# Patient Record
Sex: FEMALE
[De-identification: names, ages, dates, MRNs, and addresses within clinical notes are randomized per-mention and may not be internally consistent; named-entity substitution may affect disease eponyms.]

---

## 2019-11-07 ENCOUNTER — APPOINTMENT (OUTPATIENT)
Dept: ORTHOPEDIC SURGERY | Facility: CLINIC | Age: 59
End: 2019-11-07
Payer: COMMERCIAL

## 2019-11-07 DIAGNOSIS — Z78.9 OTHER SPECIFIED HEALTH STATUS: ICD-10-CM

## 2019-11-07 DIAGNOSIS — Z72.89 OTHER PROBLEMS RELATED TO LIFESTYLE: ICD-10-CM

## 2019-11-07 DIAGNOSIS — M20.41 OTHER HAMMER TOE(S) (ACQUIRED), RIGHT FOOT: ICD-10-CM

## 2019-11-07 DIAGNOSIS — M21.611 BUNION OF RIGHT FOOT: ICD-10-CM

## 2019-11-07 DIAGNOSIS — M79.671 PAIN IN RIGHT FOOT: ICD-10-CM

## 2019-11-07 DIAGNOSIS — M79.672 PAIN IN RIGHT FOOT: ICD-10-CM

## 2019-11-07 DIAGNOSIS — Z80.41 FAMILY HISTORY OF MALIGNANT NEOPLASM OF OVARY: ICD-10-CM

## 2019-11-07 DIAGNOSIS — M21.612 BUNION OF LEFT FOOT: ICD-10-CM

## 2019-11-07 DIAGNOSIS — M20.42 OTHER HAMMER TOE(S) (ACQUIRED), LEFT FOOT: ICD-10-CM

## 2019-11-07 PROCEDURE — 99204 OFFICE O/P NEW MOD 45 MIN: CPT

## 2019-11-07 PROCEDURE — 73630 X-RAY EXAM OF FOOT: CPT | Mod: RT

## 2019-11-07 RX ORDER — TOPIRAMATE 50 MG/1
50 TABLET, FILM COATED ORAL
Qty: 30 | Refills: 0 | Status: ACTIVE | COMMUNITY
Start: 2019-10-29

## 2019-11-07 RX ORDER — SUMATRIPTAN 100 MG/1
100 TABLET, FILM COATED ORAL
Qty: 30 | Refills: 0 | Status: ACTIVE | COMMUNITY
Start: 2018-11-03

## 2019-11-07 RX ORDER — DESVENLAFAXINE 100 MG/1
100 TABLET, EXTENDED RELEASE ORAL
Qty: 90 | Refills: 0 | Status: ACTIVE | COMMUNITY
Start: 2018-11-03

## 2019-11-07 RX ORDER — CIPROFLOXACIN HYDROCHLORIDE 500 MG/1
500 TABLET, FILM COATED ORAL
Qty: 10 | Refills: 0 | Status: ACTIVE | COMMUNITY
Start: 2019-10-30

## 2019-11-07 RX ORDER — MUPIROCIN 20 MG/G
2 OINTMENT TOPICAL
Qty: 22 | Refills: 0 | Status: ACTIVE | COMMUNITY
Start: 2019-07-22

## 2019-11-07 RX ORDER — PHENTERMINE HYDROCHLORIDE 37.5 MG/1
37.5 TABLET ORAL
Qty: 30 | Refills: 0 | Status: ACTIVE | COMMUNITY
Start: 2019-10-29

## 2019-11-07 NOTE — DISCUSSION/SUMMARY
[de-identified] : Today I had a lengthy discussion with the patient regarding their BL foot pain.I have addressed all the patient's concerns surrounding the pathology of their condition. A discussion was had about a Lapidus fusion surgery for the left foot. I informed the patient that post-surgery she will be non-weight bearing for 6 weeks. I advised the patient to follow up in the office to further discuss the surgery when she wants to proceed. The patient understood and verbally agreed to the treatment plan. All of their questions were answered and they were satisfied with the visit. The patient should call the office if they have any questions or experience worsening symptoms.\par \par \par

## 2019-11-07 NOTE — HISTORY OF PRESENT ILLNESS
[FreeTextEntry1] : 59 year old female presenting with BL foot pain. The patient’s pain is noted to be a 7/10. The patient is here because of chronic bunions and hammertoes. The left is noted to be worse than the right. The patient describes their pain as constant, shooting, and sharp. The pain is worse when wearing certain types of shoes. She is currently taking no pain medication. No other complaints at this time.\par \par \par

## 2019-11-07 NOTE — CONSULT LETTER
[Consult Letter:] : I had the pleasure of evaluating your patient, [unfilled]. [Please see my note below.] : Please see my note below. [Consult Closing:] : Thank you very much for allowing me to participate in the care of this patient.  If you have any questions, please do not hesitate to contact me. [Sincerely,] : Sincerely, [FreeTextEntry3] : Edgardo Watts, DO\par Foot and Ankle Surgery\par

## 2019-11-07 NOTE — PHYSICAL EXAM
[de-identified] : 3V of the BL feet were ordered obtained and reviewed by me today, 11/07/2019 , revealed: Significant hallux valgus, 2nd toe dislocated on the left.\par \par \par   [de-identified] : General: Alert and oriented x3. In no acute distress. Pleasant in nature with a normal affect. No apparent respiratory distress.\par \par L Foot Exam\par Skin: Clean, dry, intact\par Inspection: +significant hallux valgus, +hammertoes to 2, 3, 4, and 5th toes. No masses, no swelling, no effusion\par Pulses: 2+ DP/PT pulses\par ROM: FOOT Full ROM of digits, ANKLE 10 degrees of dorsiflexion, 40 degrees of plantarflexion, 10 degrees of subtalar motion.\par Painful ROM: None\par Tenderness: No tenderness over the medial malleolus, No tenderness over the lateral malleolus, no CFL/ATFL/PTFL pain, no deltoid ligament pain. No heel pain. No Achilles tenderness. No 5th metatarsal pain. No pain to the LisFranc joint. No ttp over the posterior tibial tendon.\par Stability: Negative anterior/posterior drawer.\par Strength: 5/5 ADD/ABD/TA/GS/EHL/FHL/EDL\par Neuro: Sensation in tact to light touch throughout\par Additional tests: Negative Mortons test, negative tarsal tunnel tinels, negative single heel rise.

## 2019-11-07 NOTE — ADDENDUM
[FreeTextEntry1] : I, Tito Thu, acted solely as a scribe for Dr. Edgardo Watts on this date 11/07/2019 .\par All medical record entries made by the Scribe were at my, Dr. Edgardo Watts, direction and personally dictated by me on 11/07/2019 . I have reviewed the chart and agree that the record accurately reflects my personal performance of the history, physical exam, assessment and plan. I have also personally directed, reviewed, and agreed with the chart.\par \par \par

## 2021-03-21 ENCOUNTER — TRANSCRIPTION ENCOUNTER (OUTPATIENT)
Age: 61
End: 2021-03-21

## 2021-03-24 ENCOUNTER — TRANSCRIPTION ENCOUNTER (OUTPATIENT)
Age: 61
End: 2021-03-24

## 2021-07-10 ENCOUNTER — INPATIENT (INPATIENT)
Facility: HOSPITAL | Age: 61
LOS: 1 days | Discharge: HOME | DRG: 87 | End: 2021-07-12
Attending: SPECIALIST | Admitting: SPECIALIST
Payer: COMMERCIAL

## 2021-07-10 VITALS
HEIGHT: 65 IN | RESPIRATION RATE: 18 BRPM | SYSTOLIC BLOOD PRESSURE: 109 MMHG | HEART RATE: 69 BPM | TEMPERATURE: 97 F | DIASTOLIC BLOOD PRESSURE: 54 MMHG | WEIGHT: 139.99 LBS

## 2021-07-10 DIAGNOSIS — I60.9 NONTRAUMATIC SUBARACHNOID HEMORRHAGE, UNSPECIFIED: ICD-10-CM

## 2021-07-10 DIAGNOSIS — Z98.890 OTHER SPECIFIED POSTPROCEDURAL STATES: Chronic | ICD-10-CM

## 2021-07-10 LAB
ALBUMIN SERPL ELPH-MCNC: 3.7 G/DL — SIGNIFICANT CHANGE UP (ref 3.3–5)
ANION GAP SERPL CALC-SCNC: 3 MMOL/L — LOW (ref 5–17)
APTT BLD: 25.1 SEC — LOW (ref 27.5–35.5)
AST SERPL-CCNC: 69 U/L — HIGH (ref 15–37)
BASOPHILS # BLD AUTO: 0.09 K/UL — SIGNIFICANT CHANGE UP (ref 0–0.2)
BASOPHILS NFR BLD AUTO: 1 % — SIGNIFICANT CHANGE UP (ref 0–2)
BUN SERPL-MCNC: 29 MG/DL — HIGH (ref 7–23)
CALCIUM SERPL-MCNC: 9.3 MG/DL — SIGNIFICANT CHANGE UP (ref 8.5–10.1)
CHLORIDE SERPL-SCNC: 106 MMOL/L — SIGNIFICANT CHANGE UP (ref 96–108)
CO2 SERPL-SCNC: 29 MMOL/L — SIGNIFICANT CHANGE UP (ref 22–31)
CREAT SERPL-MCNC: 0.68 MG/DL — SIGNIFICANT CHANGE UP (ref 0.5–1.3)
EOSINOPHIL # BLD AUTO: 0.15 K/UL — SIGNIFICANT CHANGE UP (ref 0–0.5)
EOSINOPHIL NFR BLD AUTO: 1.7 % — SIGNIFICANT CHANGE UP (ref 0–6)
GLUCOSE SERPL-MCNC: 128 MG/DL — HIGH (ref 70–99)
HCT VFR BLD CALC: 37 % — SIGNIFICANT CHANGE UP (ref 34.5–45)
HGB BLD-MCNC: 11.9 G/DL — SIGNIFICANT CHANGE UP (ref 11.5–15.5)
IMM GRANULOCYTES NFR BLD AUTO: 0.4 % — SIGNIFICANT CHANGE UP (ref 0–1.5)
INR BLD: 0.9 RATIO — SIGNIFICANT CHANGE UP (ref 0.88–1.16)
LYMPHOCYTES # BLD AUTO: 2.3 K/UL — SIGNIFICANT CHANGE UP (ref 1–3.3)
LYMPHOCYTES # BLD AUTO: 25.8 % — SIGNIFICANT CHANGE UP (ref 13–44)
MCHC RBC-ENTMCNC: 29.4 PG — SIGNIFICANT CHANGE UP (ref 27–34)
MCHC RBC-ENTMCNC: 32.2 GM/DL — SIGNIFICANT CHANGE UP (ref 32–36)
MCV RBC AUTO: 91.4 FL — SIGNIFICANT CHANGE UP (ref 80–100)
MONOCYTES # BLD AUTO: 0.83 K/UL — SIGNIFICANT CHANGE UP (ref 0–0.9)
MONOCYTES NFR BLD AUTO: 9.3 % — SIGNIFICANT CHANGE UP (ref 2–14)
NEUTROPHILS # BLD AUTO: 5.5 K/UL — SIGNIFICANT CHANGE UP (ref 1.8–7.4)
NEUTROPHILS NFR BLD AUTO: 61.8 % — SIGNIFICANT CHANGE UP (ref 43–77)
PLATELET # BLD AUTO: 270 K/UL — SIGNIFICANT CHANGE UP (ref 150–400)
POTASSIUM SERPL-MCNC: 3.7 MMOL/L — SIGNIFICANT CHANGE UP (ref 3.5–5.3)
POTASSIUM SERPL-SCNC: 3.7 MMOL/L — SIGNIFICANT CHANGE UP (ref 3.5–5.3)
PROTHROM AB SERPL-ACNC: 10.5 SEC — LOW (ref 10.6–13.6)
RBC # BLD: 4.05 M/UL — SIGNIFICANT CHANGE UP (ref 3.8–5.2)
RBC # FLD: 12.8 % — SIGNIFICANT CHANGE UP (ref 10.3–14.5)
SODIUM SERPL-SCNC: 138 MMOL/L — SIGNIFICANT CHANGE UP (ref 135–145)
WBC # BLD: 8.91 K/UL — SIGNIFICANT CHANGE UP (ref 3.8–10.5)
WBC # FLD AUTO: 8.91 K/UL — SIGNIFICANT CHANGE UP (ref 3.8–10.5)

## 2021-07-10 PROCEDURE — 85025 COMPLETE CBC W/AUTO DIFF WBC: CPT

## 2021-07-10 PROCEDURE — A9579: CPT

## 2021-07-10 PROCEDURE — 85610 PROTHROMBIN TIME: CPT

## 2021-07-10 PROCEDURE — 70450 CT HEAD/BRAIN W/O DYE: CPT | Mod: 26,MA

## 2021-07-10 PROCEDURE — 85730 THROMBOPLASTIN TIME PARTIAL: CPT

## 2021-07-10 PROCEDURE — 80053 COMPREHEN METABOLIC PANEL: CPT

## 2021-07-10 PROCEDURE — 87086 URINE CULTURE/COLONY COUNT: CPT

## 2021-07-10 PROCEDURE — 70498 CT ANGIOGRAPHY NECK: CPT

## 2021-07-10 PROCEDURE — 93306 TTE W/DOPPLER COMPLETE: CPT

## 2021-07-10 PROCEDURE — 84443 ASSAY THYROID STIM HORMONE: CPT

## 2021-07-10 PROCEDURE — 70496 CT ANGIOGRAPHY HEAD: CPT

## 2021-07-10 PROCEDURE — 83036 HEMOGLOBIN GLYCOSYLATED A1C: CPT

## 2021-07-10 PROCEDURE — 81003 URINALYSIS AUTO W/O SCOPE: CPT

## 2021-07-10 PROCEDURE — 36415 COLL VENOUS BLD VENIPUNCTURE: CPT

## 2021-07-10 PROCEDURE — 70450 CT HEAD/BRAIN W/O DYE: CPT

## 2021-07-10 PROCEDURE — 71045 X-RAY EXAM CHEST 1 VIEW: CPT | Mod: 26

## 2021-07-10 PROCEDURE — 93010 ELECTROCARDIOGRAM REPORT: CPT

## 2021-07-10 PROCEDURE — 72125 CT NECK SPINE W/O DYE: CPT | Mod: 26,MA

## 2021-07-10 PROCEDURE — 99291 CRITICAL CARE FIRST HOUR: CPT

## 2021-07-10 PROCEDURE — U0003: CPT

## 2021-07-10 PROCEDURE — 70553 MRI BRAIN STEM W/O & W/DYE: CPT

## 2021-07-10 PROCEDURE — 99221 1ST HOSP IP/OBS SF/LOW 40: CPT

## 2021-07-10 PROCEDURE — 86769 SARS-COV-2 COVID-19 ANTIBODY: CPT

## 2021-07-10 PROCEDURE — U0005: CPT

## 2021-07-10 PROCEDURE — 86803 HEPATITIS C AB TEST: CPT

## 2021-07-10 PROCEDURE — 80307 DRUG TEST PRSMV CHEM ANLYZR: CPT

## 2021-07-10 RX ORDER — THIAMINE MONONITRATE (VIT B1) 100 MG
100 TABLET ORAL DAILY
Refills: 0 | Status: DISCONTINUED | OUTPATIENT
Start: 2021-07-10 | End: 2021-07-12

## 2021-07-10 RX ORDER — SODIUM CHLORIDE 9 MG/ML
1000 INJECTION INTRAMUSCULAR; INTRAVENOUS; SUBCUTANEOUS
Refills: 0 | Status: DISCONTINUED | OUTPATIENT
Start: 2021-07-10 | End: 2021-07-12

## 2021-07-10 RX ORDER — ONDANSETRON 8 MG/1
4 TABLET, FILM COATED ORAL EVERY 6 HOURS
Refills: 0 | Status: DISCONTINUED | OUTPATIENT
Start: 2021-07-10 | End: 2021-07-12

## 2021-07-10 RX ORDER — LEVETIRACETAM 250 MG/1
500 TABLET, FILM COATED ORAL EVERY 12 HOURS
Refills: 0 | Status: DISCONTINUED | OUTPATIENT
Start: 2021-07-10 | End: 2021-07-12

## 2021-07-10 RX ORDER — PANTOPRAZOLE SODIUM 20 MG/1
40 TABLET, DELAYED RELEASE ORAL
Refills: 0 | Status: DISCONTINUED | OUTPATIENT
Start: 2021-07-10 | End: 2021-07-12

## 2021-07-10 RX ORDER — FOLIC ACID 0.8 MG
1 TABLET ORAL DAILY
Refills: 0 | Status: DISCONTINUED | OUTPATIENT
Start: 2021-07-10 | End: 2021-07-12

## 2021-07-10 NOTE — ED PROVIDER NOTE - OBJECTIVE STATEMENT
60 y/o female with no pertinent PMHx, presents to the ED BIBA s/p unwitnessed fall at home. Pt does not remember what happened. Is unsure if she had a syncopal episode prior to fall. Pt hit the back of her head. No other injury. +drinking alcohol tonight. Not on blood thinners.

## 2021-07-10 NOTE — ED PROVIDER NOTE - MUSCULOSKELETAL, MLM
Spine appears normal, range of motion is not limited, no muscle or joint tenderness. no visible evidence of trauma.

## 2021-07-10 NOTE — H&P ADULT - NSHPPHYSICALEXAM_GEN_ALL_CORE
Constitutional: awake and alert.  HEENT: PERRLA, EOMI  Neck: Supple.  Respiratory: Breath sounds are clear bilaterally  Cardiovascular: S1 and S2, regular   Gastrointestinal: soft, nontender  Extremities:  no edema  Musculoskeletal: no joint swelling/tenderness, no abnormal movements  Skin: No rashes    Neurological exam:  HF: A x O x 3. Appropriately interactive, normal affect. Speech fluent, No Aphasia or paraphasic errors. Naming /repetition intact   CN: KAREN, EOMI, VFF, facial sensation normal, no NLFD, tongue midline  Motor: No pronator drift, Strength 5/5 in all 4 ext, normal bulk and tone, no tremor, rigidity or bradykinesia.    Sens: Intact to light touch  Reflexes: Symmetric and normal, downgoing toes b/l  Coord:  No FNFA, dysmetria, ROCKY intact

## 2021-07-10 NOTE — ED PROVIDER NOTE - PROGRESS NOTE DETAILS
SAH noted.   Likely traumatic in etiology.  Given unwitnessed and patient does not recall, will  angio for  eval.  Discussed with NSG who will admit for further w/u and monitoring.  Further care per inpatient treatmtent team.

## 2021-07-10 NOTE — ED ADULT TRIAGE NOTE - CHIEF COMPLAINT QUOTE
Pt BIBEMS s/p unwitnessed fall at home.  +ETOH, + marijuana use.  Pt w/ c/o of pain to back of head.  No A/C.  MD Feliciano made aware.  neuro alert called.  Pt sent to CT

## 2021-07-10 NOTE — CHART NOTE - NSCHARTNOTEFT_GEN_A_CORE
CAPRINI SCORE [CLOT]    AGE RELATED RISK FACTORS                                                       MOBILITY RELATED FACTORS  [ ] Age 41-60 years                                            (1 Point)                  [ ] Bed rest                                                        (1 Point)  [ x] Age: 61-74 years                                           (2 Points)                 [ ] Plaster cast                                                   (2 Points)  [ ] Age= 75 years                                              (3 Points)                 [ ] Bed bound for more than 72 hours                 (2 Points)    DISEASE RELATED RISK FACTORS                                               GENDER SPECIFIC FACTORS  [ ] Edema in the lower extremities                       (1 Point)                  [ ] Pregnancy                                                     (1 Point)  [ ] Varicose veins                                               (1 Point)                  [ ] Post-partum < 6 weeks                                   (1 Point)             [ ] BMI > 25 Kg/m2                                            (1 Point)                  [ ] Hormonal therapy  or oral contraception          (1 Point)                 [ ] Sepsis (in the previous month)                        (1 Point)                  [ ] History of pregnancy complications                 (1 point)  [ ] Pneumonia or serious lung disease                                               [ ] Unexplained or recurrent                     (1 Point)           (in the previous month)                               (1 Point)  [ ] Abnormal pulmonary function test                     (1 Point)                 SURGERY RELATED RISK FACTORS  [ ] Acute myocardial infarction                              (1 Point)                 [ ]  Section                                             (1 Point)  [ ] Congestive heart failure (in the previous month)  (1 Point)               [ ] Minor surgery                                                  (1 Point)   [ ] Inflammatory bowel disease                             (1 Point)                 [ ] Arthroscopic surgery                                        (2 Points)  [ ] Central venous access                                      (2 Points)                [ ] General surgery lasting more than 45 minutes   (2 Points)       [ ] Stroke (in the previous month)                          (5 Points)               [ ] Elective arthroplasty                                         (5 Points)                                                                                                                                               HEMATOLOGY RELATED FACTORS                                                 TRAUMA RELATED RISK FACTORS  [ ] Prior episodes of VTE                                     (3 Points)                [ ] Fracture of the hip, pelvis, or leg                       (5 Points)  [ ] Positive family history for VTE                         (3 Points)                 [ ] Acute spinal cord injury (in the previous month)  (5 Points)  [ ] Prothrombin 93243 A                                     (3 Points)                 [ ] Paralysis  (less than 1 month)                             (5 Points)  [ ] Factor V Leiden                                             (3 Points)                  [ ] Multiple Trauma within 1 month                        (5 Points)  [ ] Lupus anticoagulants                                     (3 Points)                                                           [ ] Anticardiolipin antibodies                               (3 Points)                                                       [ ] High homocysteine in the blood                      (3 Points)                                             [ ] Other congenital or acquired thrombophilia      (3 Points)                                                [ ] Heparin induced thrombocytopenia                  (3 Points)                                          Total Score [   2       ]    due to presence of ICH will use mechanical prophylaxis at this time and reassess during admission

## 2021-07-10 NOTE — ED PROVIDER NOTE - CLINICAL SUMMARY MEDICAL DECISION MAKING FREE TEXT BOX
pt intoxicated likely mechanical fall which was unwitnessed, does not recall event, will ct head, ekg, cardiac enzyme, reassess. pt intoxicated. likely mechanical fall which was unwitnessed, does not recall event, will ct head, ekg, cardiac enzyme, reassess.

## 2021-07-10 NOTE — H&P ADULT - NSICDXPASTSURGICALHX_GEN_ALL_CORE_FT
PAST SURGICAL HISTORY:  H/O bilateral breast reduction surgery     S/P excision of acoustic neuroma 1983

## 2021-07-10 NOTE — H&P ADULT - NSHPLABSRESULTS_GEN_ALL_CORE
11.9   8.91  )-----------( 270      ( 10 Jul 2021 23:09 )             37.0   07-10    138  |  106  |  29<H>  ----------------------------<  128<H>  3.7   |  29  |  0.68    Ca    9.3      10 Jul 2021 23:09    TPro  x   /  Alb  3.7  /  TBili  x   /  DBili  x   /  AST  69<H>  /  ALT  x   /  AlkPhos  x   07-10    Prothrombin Time and INR, Plasma (07.10.21 @ 23:30)   Prothrombin Time, Plasma: 10.5 sec   INR: 0.90: Recommended ranges for therapeutic INR: Activated Partial Thromboplastin Time: 25.1 sec (07.10.21 @ 23:30)     < from: CT Head No Cont (07.10.21 @ 22:29) >    Impression:  1. Small amount of bilateral acute subarachnoid hemorrhage.  2. No acute displaced cervical spine fracture.    Findings were discussed with Dr. Feliciano at 10:59 PM on 7/10/21  Dr. Jada Ricketts

## 2021-07-10 NOTE — ED ADULT NURSE NOTE - NSIMPLEMENTINTERV_GEN_ALL_ED
Implemented All Fall Risk Interventions:  [place] to call system. Call bell, personal items and telephone within reach. Instruct patient to call for assistance. Room bathroom lighting operational. Non-slip footwear when patient is off stretcher. Physically safe environment: no spills, clutter or unnecessary equipment. Stretcher in lowest position, wheels locked, appropriate side rails in place. Provide visual cue, wrist band, yellow gown, etc. Monitor gait and stability. Monitor for mental status changes and reorient to person, place, and time. Review medications for side effects contributing to fall risk. Reinforce activity limits and safety measures with patient and family.

## 2021-07-10 NOTE — H&P ADULT - ASSESSMENT
61 year old female s/p unwitnessed fall s/p intoxication with ETOH and marijuana with TSAH.     Imaging reviewed by and case discussed with Dr. Castaneda  admit to SDU/CICU for monitoring/observation  q2h neuro checks  neuro stable  follow up ETOH level  IVF  repeat CTH in 6 hrs or sooner if any mental status changes  Keppra for seizure prophylaxis  no acute neurosurgical intervention  pain control  dvt prophylaxis with SCD  npo x meds while awaiting repeat CT scan.   discussed plan with patient and spouse in agreement and understand plan.

## 2021-07-10 NOTE — H&P ADULT - HISTORY OF PRESENT ILLNESS
61 year old female was at home with spouse when she had an unwitnessed fall from standing per . He reports he didn't see her fall but that she was standing and she fell with + LOC reported 2 minutes. Patient amnestic to events. Denies tongue biting, hit the back of her head and complains of mild headache and soreness to scalp. Patient admits to drinking wine and smoking marijuana which she reports really affected her. Patient denies urinating on herself and both patient and spouse deny tonic clonic or jerking movements. Denies n/v/diplopia. Patient denies any blood thinners and reports she does not take daily medications.

## 2021-07-10 NOTE — ED ADULT NURSE NOTE - OBJECTIVE STATEMENT
patient axox3, s/p unwitnessed fall at home. patient does not remember details surrounding fall. patient is unsure if she had a syncopal episode prior to fall. patient hit the back of her head, + LOC. patient denies additional injuries. patient states she drank 2 alcoholic beverages tonight. patient not on blood thinners. patient c/o mild headache. patient denies vision changes, n/v/d, fever, chills, cough, chest pain, SOB. color good, skin warm and dry, respirations even and unlabored. patient able to speak in full sentences.

## 2021-07-11 LAB
A1C WITH ESTIMATED AVERAGE GLUCOSE RESULT: 5.6 % — SIGNIFICANT CHANGE UP (ref 4–5.6)
ALBUMIN SERPL ELPH-MCNC: 3.7 G/DL — SIGNIFICANT CHANGE UP (ref 3.3–5)
ALP SERPL-CCNC: 53 U/L — SIGNIFICANT CHANGE UP (ref 40–120)
ALP SERPL-CCNC: 55 U/L — SIGNIFICANT CHANGE UP (ref 40–120)
ALT FLD-CCNC: 52 U/L — SIGNIFICANT CHANGE UP (ref 12–78)
ALT FLD-CCNC: 58 U/L — SIGNIFICANT CHANGE UP (ref 12–78)
ANION GAP SERPL CALC-SCNC: 5 MMOL/L — SIGNIFICANT CHANGE UP (ref 5–17)
APPEARANCE UR: CLEAR — SIGNIFICANT CHANGE UP
AST SERPL-CCNC: 34 U/L — SIGNIFICANT CHANGE UP (ref 15–37)
BASOPHILS # BLD AUTO: 0.07 K/UL — SIGNIFICANT CHANGE UP (ref 0–0.2)
BASOPHILS NFR BLD AUTO: 0.6 % — SIGNIFICANT CHANGE UP (ref 0–2)
BILIRUB SERPL-MCNC: 0.2 MG/DL — SIGNIFICANT CHANGE UP (ref 0.2–1.2)
BILIRUB SERPL-MCNC: 0.2 MG/DL — SIGNIFICANT CHANGE UP (ref 0.2–1.2)
BILIRUB UR-MCNC: NEGATIVE — SIGNIFICANT CHANGE UP
BLD GP AB SCN SERPL QL: SIGNIFICANT CHANGE UP
BUN SERPL-MCNC: 21 MG/DL — SIGNIFICANT CHANGE UP (ref 7–23)
CALCIUM SERPL-MCNC: 9.3 MG/DL — SIGNIFICANT CHANGE UP (ref 8.5–10.1)
CHLORIDE SERPL-SCNC: 109 MMOL/L — HIGH (ref 96–108)
CO2 SERPL-SCNC: 28 MMOL/L — SIGNIFICANT CHANGE UP (ref 22–31)
COLOR SPEC: YELLOW — SIGNIFICANT CHANGE UP
COVID-19 SPIKE DOMAIN AB INTERP: POSITIVE
COVID-19 SPIKE DOMAIN ANTIBODY RESULT: >250 U/ML — HIGH
CREAT SERPL-MCNC: 0.59 MG/DL — SIGNIFICANT CHANGE UP (ref 0.5–1.3)
DIFF PNL FLD: NEGATIVE — SIGNIFICANT CHANGE UP
EOSINOPHIL # BLD AUTO: 0.03 K/UL — SIGNIFICANT CHANGE UP (ref 0–0.5)
EOSINOPHIL NFR BLD AUTO: 0.3 % — SIGNIFICANT CHANGE UP (ref 0–6)
ESTIMATED AVERAGE GLUCOSE: 114 MG/DL — SIGNIFICANT CHANGE UP (ref 68–114)
ETHANOL SERPL-MCNC: <10 MG/DL — SIGNIFICANT CHANGE UP (ref 0–10)
GLUCOSE SERPL-MCNC: 89 MG/DL — SIGNIFICANT CHANGE UP (ref 70–99)
GLUCOSE UR QL: NEGATIVE MG/DL — SIGNIFICANT CHANGE UP
HCT VFR BLD CALC: 37.7 % — SIGNIFICANT CHANGE UP (ref 34.5–45)
HCV AB S/CO SERPL IA: 0.09 S/CO — SIGNIFICANT CHANGE UP (ref 0–0.99)
HCV AB SERPL-IMP: SIGNIFICANT CHANGE UP
HGB BLD-MCNC: 12.3 G/DL — SIGNIFICANT CHANGE UP (ref 11.5–15.5)
IMM GRANULOCYTES NFR BLD AUTO: 0.3 % — SIGNIFICANT CHANGE UP (ref 0–1.5)
KETONES UR-MCNC: NEGATIVE — SIGNIFICANT CHANGE UP
LEUKOCYTE ESTERASE UR-ACNC: NEGATIVE — SIGNIFICANT CHANGE UP
LYMPHOCYTES # BLD AUTO: 1.72 K/UL — SIGNIFICANT CHANGE UP (ref 1–3.3)
LYMPHOCYTES # BLD AUTO: 15.9 % — SIGNIFICANT CHANGE UP (ref 13–44)
MCHC RBC-ENTMCNC: 29.5 PG — SIGNIFICANT CHANGE UP (ref 27–34)
MCHC RBC-ENTMCNC: 32.6 GM/DL — SIGNIFICANT CHANGE UP (ref 32–36)
MCV RBC AUTO: 90.4 FL — SIGNIFICANT CHANGE UP (ref 80–100)
MONOCYTES # BLD AUTO: 1.09 K/UL — HIGH (ref 0–0.9)
MONOCYTES NFR BLD AUTO: 10.1 % — SIGNIFICANT CHANGE UP (ref 2–14)
NEUTROPHILS # BLD AUTO: 7.89 K/UL — HIGH (ref 1.8–7.4)
NEUTROPHILS NFR BLD AUTO: 72.8 % — SIGNIFICANT CHANGE UP (ref 43–77)
NITRITE UR-MCNC: NEGATIVE — SIGNIFICANT CHANGE UP
PH UR: 7 — SIGNIFICANT CHANGE UP (ref 5–8)
PLATELET # BLD AUTO: 271 K/UL — SIGNIFICANT CHANGE UP (ref 150–400)
POTASSIUM SERPL-MCNC: 4 MMOL/L — SIGNIFICANT CHANGE UP (ref 3.5–5.3)
POTASSIUM SERPL-SCNC: 4 MMOL/L — SIGNIFICANT CHANGE UP (ref 3.5–5.3)
PROT SERPL-MCNC: 6.8 GM/DL — SIGNIFICANT CHANGE UP (ref 6–8.3)
PROT SERPL-MCNC: 6.8 GM/DL — SIGNIFICANT CHANGE UP (ref 6–8.3)
PROT UR-MCNC: NEGATIVE MG/DL — SIGNIFICANT CHANGE UP
RBC # BLD: 4.17 M/UL — SIGNIFICANT CHANGE UP (ref 3.8–5.2)
RBC # FLD: 12.8 % — SIGNIFICANT CHANGE UP (ref 10.3–14.5)
SARS-COV-2 IGG+IGM SERPL QL IA: >250 U/ML — HIGH
SARS-COV-2 IGG+IGM SERPL QL IA: POSITIVE
SODIUM SERPL-SCNC: 142 MMOL/L — SIGNIFICANT CHANGE UP (ref 135–145)
SP GR SPEC: 1.01 — SIGNIFICANT CHANGE UP (ref 1.01–1.02)
TROPONIN I SERPL-MCNC: <0.015 NG/ML — SIGNIFICANT CHANGE UP (ref 0.01–0.04)
UROBILINOGEN FLD QL: NEGATIVE MG/DL — SIGNIFICANT CHANGE UP
WBC # BLD: 10.83 K/UL — HIGH (ref 3.8–10.5)
WBC # FLD AUTO: 10.83 K/UL — HIGH (ref 3.8–10.5)

## 2021-07-11 PROCEDURE — 70496 CT ANGIOGRAPHY HEAD: CPT | Mod: 26

## 2021-07-11 PROCEDURE — 70498 CT ANGIOGRAPHY NECK: CPT | Mod: 26

## 2021-07-11 PROCEDURE — 99233 SBSQ HOSP IP/OBS HIGH 50: CPT

## 2021-07-11 PROCEDURE — 99221 1ST HOSP IP/OBS SF/LOW 40: CPT

## 2021-07-11 RX ORDER — TRAMADOL HYDROCHLORIDE 50 MG/1
50 TABLET ORAL EVERY 6 HOURS
Refills: 0 | Status: DISCONTINUED | OUTPATIENT
Start: 2021-07-11 | End: 2021-07-12

## 2021-07-11 RX ORDER — TRAMADOL HYDROCHLORIDE 50 MG/1
25 TABLET ORAL EVERY 6 HOURS
Refills: 0 | Status: DISCONTINUED | OUTPATIENT
Start: 2021-07-11 | End: 2021-07-12

## 2021-07-11 RX ORDER — ACETAMINOPHEN 500 MG
1000 TABLET ORAL ONCE
Refills: 0 | Status: COMPLETED | OUTPATIENT
Start: 2021-07-11

## 2021-07-11 RX ORDER — ACETAMINOPHEN 500 MG
1000 TABLET ORAL ONCE
Refills: 0 | Status: COMPLETED | OUTPATIENT
Start: 2021-07-11 | End: 2021-07-11

## 2021-07-11 RX ORDER — SUMATRIPTAN SUCCINATE 4 MG/.5ML
100 INJECTION, SOLUTION SUBCUTANEOUS DAILY
Refills: 0 | Status: DISCONTINUED | OUTPATIENT
Start: 2021-07-11 | End: 2021-07-12

## 2021-07-11 RX ADMIN — Medication 400 MILLIGRAM(S): at 12:56

## 2021-07-11 RX ADMIN — Medication 1000 MILLIGRAM(S): at 00:58

## 2021-07-11 RX ADMIN — Medication 1000 MILLIGRAM(S): at 13:34

## 2021-07-11 RX ADMIN — Medication 400 MILLIGRAM(S): at 00:28

## 2021-07-11 RX ADMIN — PANTOPRAZOLE SODIUM 40 MILLIGRAM(S): 20 TABLET, DELAYED RELEASE ORAL at 06:22

## 2021-07-11 RX ADMIN — TRAMADOL HYDROCHLORIDE 50 MILLIGRAM(S): 50 TABLET ORAL at 06:23

## 2021-07-11 RX ADMIN — SODIUM CHLORIDE 60 MILLILITER(S): 9 INJECTION INTRAMUSCULAR; INTRAVENOUS; SUBCUTANEOUS at 05:31

## 2021-07-11 RX ADMIN — LEVETIRACETAM 500 MILLIGRAM(S): 250 TABLET, FILM COATED ORAL at 21:17

## 2021-07-11 RX ADMIN — Medication 1 MILLIGRAM(S): at 11:33

## 2021-07-11 RX ADMIN — SUMATRIPTAN SUCCINATE 100 MILLIGRAM(S): 4 INJECTION, SOLUTION SUBCUTANEOUS at 08:56

## 2021-07-11 RX ADMIN — LEVETIRACETAM 500 MILLIGRAM(S): 250 TABLET, FILM COATED ORAL at 11:33

## 2021-07-11 RX ADMIN — Medication 400 MILLIGRAM(S): at 19:17

## 2021-07-11 RX ADMIN — SUMATRIPTAN SUCCINATE 100 MILLIGRAM(S): 4 INJECTION, SOLUTION SUBCUTANEOUS at 11:05

## 2021-07-11 RX ADMIN — TRAMADOL HYDROCHLORIDE 50 MILLIGRAM(S): 50 TABLET ORAL at 21:20

## 2021-07-11 RX ADMIN — Medication 1 TABLET(S): at 11:33

## 2021-07-11 RX ADMIN — Medication 100 MILLIGRAM(S): at 11:33

## 2021-07-12 ENCOUNTER — TRANSCRIPTION ENCOUNTER (OUTPATIENT)
Age: 61
End: 2021-07-12

## 2021-07-12 VITALS
SYSTOLIC BLOOD PRESSURE: 117 MMHG | HEART RATE: 66 BPM | DIASTOLIC BLOOD PRESSURE: 48 MMHG | RESPIRATION RATE: 12 BRPM | OXYGEN SATURATION: 98 %

## 2021-07-12 LAB — TSH SERPL-MCNC: 1.78 UIU/ML — SIGNIFICANT CHANGE UP (ref 0.36–3.74)

## 2021-07-12 PROCEDURE — 99239 HOSP IP/OBS DSCHRG MGMT >30: CPT

## 2021-07-12 PROCEDURE — 70553 MRI BRAIN STEM W/O & W/DYE: CPT | Mod: 26

## 2021-07-12 PROCEDURE — 99232 SBSQ HOSP IP/OBS MODERATE 35: CPT

## 2021-07-12 PROCEDURE — 93306 TTE W/DOPPLER COMPLETE: CPT | Mod: 26

## 2021-07-12 RX ORDER — DESVENLAFAXINE 50 MG/1
100 TABLET, EXTENDED RELEASE ORAL DAILY
Refills: 0 | Status: DISCONTINUED | OUTPATIENT
Start: 2021-07-12 | End: 2021-07-12

## 2021-07-12 RX ORDER — PROPRANOLOL HCL 160 MG
1 CAPSULE, EXTENDED RELEASE 24HR ORAL
Qty: 0 | Refills: 0 | DISCHARGE
Start: 2021-07-12

## 2021-07-12 RX ORDER — SUMATRIPTAN SUCCINATE 4 MG/.5ML
1 INJECTION, SOLUTION SUBCUTANEOUS
Qty: 0 | Refills: 0 | DISCHARGE
Start: 2021-07-12

## 2021-07-12 RX ORDER — PROPRANOLOL HCL 160 MG
60 CAPSULE, EXTENDED RELEASE 24HR ORAL DAILY
Refills: 0 | Status: DISCONTINUED | OUTPATIENT
Start: 2021-07-12 | End: 2021-07-12

## 2021-07-12 RX ORDER — ACETAMINOPHEN 500 MG
1000 TABLET ORAL ONCE
Refills: 0 | Status: COMPLETED | OUTPATIENT
Start: 2021-07-12 | End: 2021-07-12

## 2021-07-12 RX ORDER — DESVENLAFAXINE 50 MG/1
1 TABLET, EXTENDED RELEASE ORAL
Qty: 0 | Refills: 0 | DISCHARGE
Start: 2021-07-12

## 2021-07-12 RX ADMIN — TRAMADOL HYDROCHLORIDE 50 MILLIGRAM(S): 50 TABLET ORAL at 12:36

## 2021-07-12 RX ADMIN — Medication 400 MILLIGRAM(S): at 11:09

## 2021-07-12 RX ADMIN — DESVENLAFAXINE 100 MILLIGRAM(S): 50 TABLET, EXTENDED RELEASE ORAL at 11:09

## 2021-07-12 RX ADMIN — Medication 60 MILLIGRAM(S): at 11:09

## 2021-07-12 RX ADMIN — Medication 1 TABLET(S): at 11:16

## 2021-07-12 RX ADMIN — LEVETIRACETAM 500 MILLIGRAM(S): 250 TABLET, FILM COATED ORAL at 11:10

## 2021-07-12 RX ADMIN — Medication 100 MILLIGRAM(S): at 11:10

## 2021-07-12 RX ADMIN — Medication 1 MILLIGRAM(S): at 11:10

## 2021-07-12 RX ADMIN — SUMATRIPTAN SUCCINATE 100 MILLIGRAM(S): 4 INJECTION, SOLUTION SUBCUTANEOUS at 03:37

## 2021-07-12 NOTE — PROGRESS NOTE ADULT - SUBJECTIVE AND OBJECTIVE BOX
HPI:  61 year old female was at home with spouse when she had an unwitnessed fall from standing per . He reports he didn't see her fall but that she was standing and she fell with + LOC reported 2 minutes. Patient amnestic to events. Denies tongue biting, hit the back of her head and complains of mild headache and soreness to scalp. Patient admits to drinking wine and smoking marijuana which she reports really affected her. Patient denies urinating on herself and both patient and spouse deny tonic clonic or jerking movements. Denies n/v/diplopia. Patient denies any blood thinners and reports she does not take daily medications. CT shows SAH.     7/1- Pt seen and examined, c/o headache and double vision, +photophobia, denies nausea or vomiting. CT head shows stable SAH. Pt is amnesiac to event.   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     Vital Signs Last 24 Hrs  T(C): 36.6 (11 Jul 2021 05:20), Max: 36.7 (11 Jul 2021 01:13)  T(F): 97.9 (11 Jul 2021 05:20), Max: 98 (11 Jul 2021 01:13)  HR: 64 (11 Jul 2021 08:00) (56 - 76)  BP: 130/57 (11 Jul 2021 08:00) (109/54 - 138/77)  BP(mean): 73 (11 Jul 2021 08:00) (72 - 94)  RR: 15 (11 Jul 2021 08:00) (6 - 18)  SpO2: 96% (11 Jul 2021 08:00) (95% - 100%)    MEDICATIONS  (STANDING):  folic acid 1 milliGRAM(s) Oral daily  levETIRAcetam 500 milliGRAM(s) Oral every 12 hours  multivitamin 1 Tablet(s) Oral daily  pantoprazole    Tablet 40 milliGRAM(s) Oral before breakfast  sodium chloride 0.9%. 1000 milliLiter(s) (60 mL/Hr) IV Continuous  thiamine 100 milliGRAM(s) Oral daily    MEDICATIONS  (PRN):  acetaminophen  IVPB .. 1000 milliGRAM(s) IV Intermittent once PRN Moderate Pain (4 - 6), Severe Pain (7 - 10)  ondansetron Injectable 4 milliGRAM(s) IV Push every 6 hours PRN Nausea and/or Vomiting  SUMAtriptan 100 milliGRAM(s) Oral daily PRN Migraine  traMADol 25 milliGRAM(s) Oral every 6 hours PRN Moderate Pain (4 - 6)  traMADol 50 milliGRAM(s) Oral every 6 hours PRN Severe Pain (7 - 10)    ROS: pertinent positives in HPI, all other ROS were reviewed and are negative     PHYSICAL EXAM:  Constitutional: awake and alert  HEENT: PERRLA, EOMI  Neck: Supple  Respiratory: Breath sounds are clear bilaterally  Cardiovascular: S1 and S2, regular rhythm  Gastrointestinal: soft, nontender  Extremities:  no edema  Vascular: Carotid Bruit - no  Musculoskeletal: no joint swelling/tenderness, no abnormal movements  Skin: No rashes    Neurological exam:  HF: AxO x 3. Appropriately interactive, normal affect. Speech fluent, No Aphasia or paraphasic errors. Naming /repetition intact   CN: PEARRL, EOMI, VFF, facial sensation normal, no NLFD, tongue midline, Palate moves equally, SCM equal bilaterally, hearing loss right ear   Motor: No pronator drift, Strength 5/5 in all 4 ext, normal bulk and tone, no tremor, rigidity or bradykinesia.    Sens: Intact to light touch / PP/ VS/ JS    Reflexes: Symmetric and normal, downgoing toes b/l  Coord:  No FNFA, dysmetria, ROCKY intact   Gait/Balance: Not tested     LABS:                         12.3   10.83 )-----------( 271      ( 11 Jul 2021 07:20 )             37.7     07-11    142  |  109<H>  |  21  ----------------------------<  89  4.0   |  28  |  0.59    Ca    9.3      11 Jul 2021 07:20    TPro  6.8  /  Alb  3.7  /  TBili  0.2  /  DBili  x   /  AST  34  /  ALT  52  /  AlkPhos  53  07-11    LIVER FUNCTIONS - ( 11 Jul 2021 07:20 )  Alb: 3.7 g/dL / Pro: 6.8 gm/dL / ALK PHOS: 53 U/L / ALT: 52 U/L / AST: 34 U/L / GGT: x           RADIOLOGY:  < from: CT Head No Cont (07.11.21 @ 04:54) >  IMPRESSION:  Noncontrast head CT:  Stable exam. Bilateral subarachnoid hemorrhage likely posttraumatic given the right parietal scalp swelling. No new intracranial hemorrhage or vasogenic edema.  A thin low density subdural collection along the right frontal convexity is stable.  A follow-up noncontrast head ct exam done at 4:52 AM is linked to the CTA accessions.  Stable bilateral scattered subarachnoid hemorrhage. A thin low-density subdural collection along the right frontal convexity appears unchanged measuring up to 4 mm.   There is no new intracranial hemorrhage, vasogenic edema or midline shift.  There is no hydrocephalus. No new extra-axial collection.  No calvarial fracture.    CTA head and neck:  No major vessel occlusion, hemodynamically significant stenosis, dissection or aneurysm.  Congenital variation in anatomy as above.    
HPI:  61 year old female was at home with spouse when she had an unwitnessed fall from standing per . He reports he didn't see her fall but that she was standing and she fell with + LOC reported 2 minutes. Patient amnestic to events. Denies tongue biting, hit the back of her head and complains of mild headache and soreness to scalp. Patient admits to drinking wine and smoking marijuana which she reports really affected her. Patient denies urinating on herself and both patient and spouse deny tonic clonic or jerking movements. Denies n/v/diplopia. Patient denies any blood thinners and reports she does not take daily medications. CT shows SAH.     7/11- Pt seen and examined, c/o headache and double vision, +photophobia, denies nausea or vomiting. CT head shows stable SAH. Pt is amnesiac to event.   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     7/12- pt seen and examined on AM rounds, continued headache and double vision, pending MRI +/- contrast, hospitalist note appreciated, pending cardiology consult  no seizure activity tolerating PO, afebrile, able to ambulate independently     Vital Signs Last 24 Hrs  T(C): 36.6 (12 Jul 2021 05:00), Max: 36.7 (11 Jul 2021 16:28)  T(F): 97.9 (12 Jul 2021 05:00), Max: 98 (11 Jul 2021 16:28)  HR: 60 (12 Jul 2021 07:00) (59 - 81)  BP: 108/46 (12 Jul 2021 07:00) (102/52 - 137/68)  BP(mean): 60 (12 Jul 2021 07:00) (60 - 86)  RR: 15 (12 Jul 2021 07:00) (12 - 19)  SpO2: 95% (12 Jul 2021 07:00) (94% - 100%)    MEDICATIONS  (STANDING):  desvenlafaxine  milliGRAM(s) Oral daily  folic acid 1 milliGRAM(s) Oral daily  levETIRAcetam 500 milliGRAM(s) Oral every 12 hours  multivitamin 1 Tablet(s) Oral daily  pantoprazole    Tablet 40 milliGRAM(s) Oral before breakfast  propranolol 40 milliGRAM(s) Oral daily  sodium chloride 0.9%. 1000 milliLiter(s) (60 mL/Hr) IV Continuous   thiamine 100 milliGRAM(s) Oral daily    MEDICATIONS  (PRN):  acetaminophen  IVPB .. 1000 milliGRAM(s) IV Intermittent once PRN Moderate Pain (4 - 6), Severe Pain (7 - 10)  ondansetron Injectable 4 milliGRAM(s) IV Push every 6 hours PRN Nausea and/or Vomiting  SUMAtriptan 100 milliGRAM(s) Oral daily PRN Migraine  traMADol 25 milliGRAM(s) Oral every 6 hours PRN Moderate Pain (4 - 6)  traMADol 50 milliGRAM(s) Oral every 6 hours PRN Severe Pain (7 - 10)    ROS: pertinent positives in HPI, all other ROS were reviewed and are negative     PHYSICAL EXAM:  Constitutional: awake and alert  HEENT: PERRLA, EOMI  Neck: Supple  Respiratory: Breath sounds are clear bilaterally  Cardiovascular: S1 and S2, regular rhythm  Gastrointestinal: soft, nontender  Extremities:  no edema  Vascular: Carotid Bruit - no  Musculoskeletal: no joint swelling/tenderness, no abnormal movements  Skin: No rashes    Neurological exam:  HF: AxO x 3. Appropriately interactive, normal affect. Speech fluent, No Aphasia or paraphasic errors. Naming /repetition intact   CN: PEARRL, EOMI, VFF, facial sensation normal, no NLFD, tongue midline, Palate moves equally, SCM equal bilaterally, hearing loss right ear   Motor: No pronator drift, Strength 5/5 in all 4 ext, normal bulk and tone, no tremor, rigidity or bradykinesia.    Sens: Intact to light touch / PP/ VS/ JS    Reflexes: Symmetric and normal, downgoing toes b/l  Coord:  No FNFA, dysmetria, ROCKY intact   Gait/Balance: Not tested     LABS:                         12.3   10.83 )-----------( 271      ( 11 Jul 2021 07:20 )             37.7     07-11    142  |  109<H>  |  21  ----------------------------<  89  4.0   |  28  |  0.59    Ca    9.3      11 Jul 2021 07:20    TPro  6.8  /  Alb  3.7  /  TBili  0.2  /  DBili  x   /  AST  34  /  ALT  52  /  AlkPhos  53  07-11    LIVER FUNCTIONS - ( 11 Jul 2021 07:20 )  Alb: 3.7 g/dL / Pro: 6.8 gm/dL / ALK PHOS: 53 U/L / ALT: 52 U/L / AST: 34 U/L / GGT: x           RADIOLOGY:  MRI +/- contrast pending   
60 yo F PMH migraines on sumatriptan and recently started on propranolol, psh acoustic neuroma s/p excision (), remote breast reduction () presents with syncope and fall in setting of one time cannabinoid use while consuming alcohol yesterday evening. Patient reports she walked to bathroom in usual state of health, remembers trying to open door of bathroom, and does not remember anything further after that. Family in room states that she regained consciousness 3-5 min later in ambulance and asked what was happening but had no recollection of event, and continues to have amnesia. Upon admission, + b/l SAH, without midline shift per reports, + double vision with L eye blurring. Patient at time of evaluation has + HA which was not alleviated by tylenol or sumatriptan.    INTERVAL HPI: + HA with diplopia  Other ROS reviewed and neg     Vital Signs Last 24 Hrs  T(C): 36.2 (2021 12:10), Max: 36.7 (2021 16:28)  T(F): 97.1 (2021 12:10), Max: 98 (2021 16:28)  HR: 75 (2021 09:45) (59 - 81)  BP: 128/74 (2021 09:45) (102/52 - 137/68)  BP(mean): 84 (2021 09:45) (60 - 86)  RR: 15 (2021 09:45) (12 - 19)  SpO2: 98% (2021 09:45) (94% - 100%)  I&O's Detail    2021 07:01  -  2021 07:00  --------------------------------------------------------  IN:    Oral Fluid: 400 mL    sodium chloride 0.9%: 720 mL  Total IN: 1120 mL    OUT:  Total OUT: 0 mL    Total NET: 1120 mL    CARDIAC MARKERS ( 10 Jul 2021 23:09 )  <0.015 ng/mL / x     / x     / x     / x                           12.3   10.83 )-----------( 271      ( 2021 07:20 )             37.7     2021 07:20    142    |  109    |  21     ----------------------------<  89     4.0     |  28     |  0.59     Ca    9.3        2021 07:20    TPro  6.8    /  Alb  3.7    /  TBili  0.2    /  DBili  x      /  AST  34     /  ALT  52     /  AlkPhos  53     2021 07:20    PT/INR - ( 10 Jul 2021 23:30 )   PT: 10.5 sec;   INR: 0.90 ratio       PTT - ( 10 Jul 2021 23:30 )  PTT:25.1 sec    LIVER FUNCTIONS - ( 2021 07:20 )  Alb: 3.7 g/dL / Pro: 6.8 gm/dL / ALK PHOS: 53 U/L / ALT: 52 U/L / AST: 34 U/L / GGT: x           Urinalysis Basic - ( 2021 16:30 )    Color: Yellow / Appearance: Clear / S.010 / pH: x  Gluc: x / Ketone: Negative  / Bili: Negative / Urobili: Negative mg/dL   Blood: x / Protein: Negative mg/dL / Nitrite: Negative   Leuk Esterase: Negative / RBC: x / WBC x   Sq Epi: x / Non Sq Epi: x / Bacteria: x    MEDICATIONS  (STANDING):  desvenlafaxine  milliGRAM(s) Oral daily  folic acid 1 milliGRAM(s) Oral daily  levETIRAcetam 500 milliGRAM(s) Oral every 12 hours  multivitamin 1 Tablet(s) Oral daily  pantoprazole    Tablet 40 milliGRAM(s) Oral before breakfast  propranolol LA 60 milliGRAM(s) Oral daily  sodium chloride 0.9%. 1000 milliLiter(s) (60 mL/Hr) IV Continuous <Continuous>  thiamine 100 milliGRAM(s) Oral daily    MEDICATIONS  (PRN):  ondansetron Injectable 4 milliGRAM(s) IV Push every 6 hours PRN Nausea and/or Vomiting  SUMAtriptan 100 milliGRAM(s) Oral daily PRN Migraine  traMADol 25 milliGRAM(s) Oral every 6 hours PRN Moderate Pain (4 - 6)  traMADol 50 milliGRAM(s) Oral every 6 hours PRN Severe Pain (7 - 10)    RADIOLOGY: personally visualized    PHYSICAL EXAM:  Appearance: Comfortable. No acute distress  HEENT:  Head and neck: Atraumatic. Normocephalic.  Normal oral mucosa, PERRL with slight sluggish L pupil on afferent response, Neck is supple. No JVD, No carotid bruit.   Neurologic: A & O x 3, see above for pupillary response, + poor covergence.  Other cranial nerves are otherwise grossly intact   Lymphatic: No cervical lymphadenopathy  Cardiovascular: S1, S2 reg  Respiratory: Lungs clear to auscultation, no RRW  Gastrointestinal:  Soft, Non-tender, + BS  Lower Extremities: No edema  Psychiatry: Patient is calm. No agitation. Mood & affect appropriate  Skin: No rashes/ ecchymoses/cyanosis/ulcers visualized on the face, hands or feet.

## 2021-07-12 NOTE — CONSULT NOTE ADULT - SUBJECTIVE AND OBJECTIVE BOX
CHIEF COMPLAINT:  Patient is a 61y old  Female who presents with a chief complaint of fall and HA    HPI:  61 year old female PMHx migraines was at home with spouse when she had an unwitnessed fall from standing per . He reports he didn't see her fall but that she was standing and she fell with + LOC reported 2 minutes. Patient amnestic to events. Patient admits to drinking 2 cups of wine and smoking marijuana which she reports really affected her. She remembers getting up to go to her house to go to the bathroom and the next thing she remembers she was in the ambulance. Patient denies urinating on herself and both patient and spouse deny tonic clonic or jerking movements. Denies n/v/diplopia. Patient denies any blood thinners and reports she does not take daily medications.    She denies prior history of syncope or even palpitations/pre-syncope.    Patient seen and examined at bedside.  Her only complaint is stable HA  She denies fevers, chill, CP, palp, SOB, abd pain, N/V, dizziness, orthopnea, PND.    PMHx:  PAST MEDICAL & SURGICAL HISTORY:  Migraines  S/P excision of acoustic neuroma    H/O bilateral breast reduction surgery    Social History:  lives with . + marijuana + etoh use. (10 Jul 2021 23:51)    FAMILY HISTORY:   FAMILY HISTORY:  No pertinent family history in first degree relatives    ALLERGIES:  Allergies  sulfa drugs (Other)    REVIEW OF SYSTEMS:  10 system ROS was obtained, all pertinent positives and negatives are in HPI otherwise they were negative.    Vital Signs Last 24 Hrs  T(C): 36 (2021 08:52), Max: 36.7 (2021 16:28)  T(F): 96.8 (2021 08:52), Max: 98 (2021 16:28)  HR: 75 (2021 09:45) (59 - 81)  BP: 128/74 (2021 09:45) (102/52 - 137/68)  BP(mean): 84 (2021 09:45) (60 - 86)  RR: 15 (2021 09:45) (12 - 19)  SpO2: 98% (2021 09:45) (94% - 100%)    I&O's Summary    2021 07:01  -  2021 07:00  --------------------------------------------------------  IN: 1120 mL / OUT: 0 mL / NET: 1120 mL    PHYSICAL EXAM:   Constitutional: awake and alert in NAD  HEENT: EOMI, Normal Hearing, MMM  Neck: Soft and supple, No LAD, No JVD, no carotid bruit  Respiratory: Breath sounds are clear bilaterally, No wheezing, rales or rhonchi, good air movement  Cardiovascular: S1 and S2, regular rate and rhythm, no murmurs, gallops or rubs. PMI is nondisplaced  Gastrointestinal: Bowel Sounds present, soft, nontender, nondistended, no guarding, no rebound  Extremities: Warm and well perfused, no peripheral edema  Vascular: 2+ peripheral pulses B/L and symmetrical  Neurological: A/O x 3, no focal deficits appreciated  Skin: No rashes appreciated    MEDICATIONS:  MEDICATIONS  (STANDING):  desvenlafaxine  milliGRAM(s) Oral daily  folic acid 1 milliGRAM(s) Oral daily  levETIRAcetam 500 milliGRAM(s) Oral every 12 hours  multivitamin 1 Tablet(s) Oral daily  pantoprazole    Tablet 40 milliGRAM(s) Oral before breakfast  propranolol LA 60 milliGRAM(s) Oral daily  sodium chloride 0.9%. 1000 milliLiter(s) (60 mL/Hr) IV Continuous <Continuous>  thiamine 100 milliGRAM(s) Oral daily      LABS: All Labs Reviewed:                        12.3   10.83 )-----------( 271      ( 2021 07:20 )             37.7     07-11    142  |  109<H>  |  21  ----------------------------<  89  4.0   |  28  |  0.59    Ca    9.3      2021 07:20    TPro  6.8  /  Alb  3.7  /  TBili  0.2  /  DBili  x   /  AST  34  /  ALT  52  /  AlkPhos  53  07-11    PT/INR - ( 10 Jul 2021 23:30 )   PT: 10.5 sec;   INR: 0.90 ratio    PTT - ( 10 Jul 2021 23:30 )  PTT:25.1 sec    CARDIAC MARKERS ( 10 Jul 2021 23:09 )  <0.015 ng/mL / x     / x     / x     / x        RADIOLOGY:    Reviewed in EMR    EK/10/21, my interpretation: NSR at 65bpm, normal EKG    TELEMETRY:    Reviewed, no significant events, remains in NSR    ECHO:    Ordered and pending    
60 yo F PMH migraines on sumatriptan and recently started on propranolol, psh acoustic neuroma s/p excision (2008), remote breast reduction (1980s) presents with syncope and fall in setting of one time cannabinoid use while consuming alcohol yesterday evening. Patient reports she walked to bathroom in usual state of health, remembers trying to open door of bathroom, and does not remember anything further after that. Family in room states that she regained consciousness 3-5 min later in ambulance and asked what was happening but had no recollection of event, and continues to have amnesia. Upon admission, + b/l SAH, without midline shift per reports, + double vision with L eye blurring. Patient at time of evaluation has + HA which was not alleviated by tylenol or sumatriptan.  ROS neg for cp, sob, n/v/d, +HA and otherwise neg as above.  SH nonsmoker, + cannabis x1 episode prior to admission, + wine 1-2 glasses socially  PSH breast reduction 1980s, acoustic neuroma  Sulfa allergy    Review of symptoms: Cardiac:  No chest pain. No dyspnea. No palpitations.  Respiratory:no cough. No dyspnea  Gastrointestinal: No diarrhea. No abdominal pain. No bleeding.     S/P excision of acoustic neuroma  H/O bilateral breast reduction surgery    	  Vitals:  T(C): 36.6 (07-11-21 @ 05:20), Max: 36.7 (07-11-21 @ 01:13)  HR: 73 (07-11-21 @ 11:41) (56 - 76)  BP: 127/55 (07-11-21 @ 11:41) (109/54 - 138/77)  RR: 16 (07-11-21 @ 11:41) (6 - 18)  SpO2: 98% (07-11-21 @ 11:41) (95% - 100%)  Wt(kg): --  I&O's Summary    Height (cm): 165.1 (07-10 @ 22:06)  Weight (kg): 63.5 (07-10 @ 22:06)  BMI (kg/m2): 23.3 (07-10 @ 22:06)    PHYSICAL EXAM:  Appearance: Comfortable. No acute distress  HEENT:  Head and neck: Atraumatic. Normocephalic.  Normal oral mucosa, PERRL with slight sluggish L pupil on afferent response, Neck is supple. No JVD, No carotid bruit.   Neurologic: A & O x 3, see above for pupillary response, + poor covergence.  Other cranial nerves are otherwise grossly intact apart from slight speech slurring at times, but word finding normal.  Lymphatic: No cervical lymphadenopathy  Cardiovascular: Normal S1 S2, No murmur, rubs/gallops. No JVD, No edema  Respiratory: Lungs clear to auscultation  Gastrointestinal:  Soft, Non-tender, + BS  Lower Extremities: No edema  Psychiatry: Patient is calm. No agitation. Mood & affect appropriate  Skin: No rashes/ ecchymoses/cyanosis/ulcers visualized on the face, hands or feet.    CURRENT MEDICATIONS:    folic acid 1 Oral  multivitamin 1 Oral  sodium chloride 0.9%. 1000 (60 mL/Hr) IV Continuous  thiamine 100 Oral  levETIRAcetam  pantoprazole    Tablet  folic acid  multivitamin  sodium chloride 0.9%.  thiamine      LABS:	 	  CARDIAC MARKERS ( 10 Jul 2021 23:09 )  <0.015 ng/mL / x     / x     / x     / x      p-BNP 10 Jul 2021 23:09: x                                12.3   10.83 )-----------( 271      ( 11 Jul 2021 07:20 )             37.7     07-11    142  |  109<H>  |  21  ----------------------------<  89  4.0   |  28  |  0.59    Ca    9.3      11 Jul 2021 07:20    TPro  6.8  /  Alb  3.7  /  TBili  0.2  /  DBili  x   /  AST  34  /  ALT  52  /  AlkPhos  53  07-11

## 2021-07-12 NOTE — DISCHARGE NOTE PROVIDER - NSDCMRMEDTOKEN_GEN_ALL_CORE_FT
desvenlafaxine (as succinate) 100 mg oral tablet, extended release: 1 tab(s) orally once a day  oxycodone-acetaminophen 5 mg-325 mg oral tablet: 1 tab(s) orally every 6 hours, As Needed -for severe pain MDD:4   propranolol 60 mg oral capsule, extended release: 1 cap(s) orally once a day  SUMAtriptan 100 mg oral tablet: 1 tab(s) orally once a day, As needed, Migraine

## 2021-07-12 NOTE — DISCHARGE NOTE PROVIDER - NSDCCPCAREPLAN_GEN_ALL_CORE_FT
PRINCIPAL DISCHARGE DIAGNOSIS  Diagnosis: Subarachnoid hemorrhage  Assessment and Plan of Treatment: s/p trauma to the head, subarachnoid hemorrage   advance activity as tolerated   Do Not Drive   double vision should improve  If headache worsens or if you start to experience nausea or vomiting please call the office with any questions or concerns  Follow up in 2-3 weeks

## 2021-07-12 NOTE — DISCHARGE NOTE NURSING/CASE MANAGEMENT/SOCIAL WORK - PATIENT PORTAL LINK FT
You can access the FollowMyHealth Patient Portal offered by [facility] by registering at the following website: [email or web address]. By joining [employer]’s FollowMyHealth portal, you will also be able to view your health information using other applications (apps) compatible with our system.

## 2021-07-12 NOTE — DISCHARGE NOTE PROVIDER - CARE PROVIDER_API CALL
Fareed Castaneda; PhD)  Neurosurgery  [address] [place] [address]  Phone: [phone]  Fax: [phone]  Follow Up Time: 2 weeks    Pam Grace (DO)  Cardiovascular Disease; Internal Medicine  [address], [address]  [place] [address]  Phone: [phone]  Fax: [phone]  Follow Up Time: 1 month

## 2021-07-12 NOTE — DISCHARGE NOTE PROVIDER - HOSPITAL COURSE
61 year old female was at home with spouse when she had an unwitnessed fall from standing per . He reports he didn't see her fall but that she was standing and she fell with + LOC reported 2 minutes. Patient amnestic to events. Denies tongue biting, hit the back of her head and complains of mild headache and soreness to scalp. Patient admits to drinking wine and smoking marijuana which she reports really affected her. Patient denies urinating on herself and both patient and spouse deny tonic clonic or jerking movements. Denies n/v/diplopia. Patient denies any blood thinners and reports she does not take daily medications. CT shows SAH.     7/11- Pt seen and examined, c/o headache and double vision, +photophobia, denies nausea or vomiting. CT head shows stable SAH. Pt is amnesiac to event.   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     7/12- pt seen and examined on AM rounds, continued headache and double vision  no seizure activity tolerating PO, afebrile, able to ambulate independently   Seen by cardiology, no acute intervention at this time, out patient follow up   MRI shows stable SAH and SDH  Plan for d/c home with follow up with Dr. Castaneda in 2-3 weeks     Vital Signs Last 24 Hrs  T(C): 36.2 (12 Jul 2021 12:10), Max: 36.7 (11 Jul 2021 16:28)  T(F): 97.1 (12 Jul 2021 12:10), Max: 98 (11 Jul 2021 16:28)  HR: 75 (12 Jul 2021 09:45) (59 - 81)  BP: 128/74 (12 Jul 2021 09:45) (102/52 - 137/68)  BP(mean): 84 (12 Jul 2021 09:45) (60 - 86)  RR: 15 (12 Jul 2021 09:45) (12 - 19)  SpO2: 98% (12 Jul 2021 09:45) (94% - 100%)    < from: MR Head w/wo IV Cont (07.12.21 @ 10:21) >  Scattered bilateral convexity subarachnoid hemorrhage, similar to prior CT. Right frontal convexity subdural collection measuring 4 mm, unchanged.  In the posterior left frontal lobe, there are multiple converging veins draining into a single prominent vein suggesting a developmental venous anomaly (19:62).  There is no evidence of acute infarct. The ventricles are normal in size for patient's age.  Flow voids of the major intracranial vessels at the skull base follow expected course and contour.  The paranasal sinuses demonstrate no signal abnormality. Right mastoidectomy. Bilateral orbits are within normal limits.    IMPRESSION:  1.  Scattered bilateral convexity subarachnoid hemorrhage, similar to prior CT.  2.  Right frontal convexity subdural collection measuring 4 mm, unchanged.  3.  Recommend dedicated MRI IAC with and without contrast for evaluation of vestibular schwannoma.

## 2021-07-12 NOTE — CONSULT NOTE ADULT - ASSESSMENT
62 yo F PMH migraines on sumatriptan and recently started on propranolol, psh acoustic neuroma s/p excision (2008), remote breast reduction (1980s) presents with syncope and fall in setting of one time cannabinoid use while consuming alcohol yesterday evening. Patient reports she walked to bathroom in usual state of health, remembers trying to open door of bathroom, and does not remember anything further after that. Family in room states that she regained consciousness 3-5 min later in ambulance and asked what was happening but had no recollection of event, and continues to have amnesia. Upon admission, + b/l SAH, without midline shift per reports, + double vision with L eye blurring. Patient at time of evaluation has + HA which was not alleviated by tylenol or sumatriptan.  ROS neg for cp, sob, n/v/d, +HA and otherwise neg as above.    Syncope, possible neurocardiogenic, less likely arrythmogenic in setting of wine + cannabinoids + recently starting propranolol 60mg ER daily ~1 month ago for chronic migraine, also notably taking sumatriptans (Imitrex) for migraine, and combined with alcohol combination can cause CNS depression.  EKG SR 65 no gross abn noted.  Appears to be stable SAH on serial CT   Agree with MRI as per neuro.  TSH  Avoid combination of triptans with ethanol  Neuro precautions with keppra/neuro checks per neurosurgery/neuro  workup to include routine echo, and patient should have holter (zio vs mcot) given strong family history of early CAD (father massive MI age 50).  Outpt cardiology follow up.  Maintain BP strictly <140mmHg, currently normotensive throughout clinical course.  HA/Pain control per neurosurgery, patient did not have significant improvement with iv tylenol and is currently receiving sumatriptans/tramadol.  Can start low dose oxycodone prn for pain however can interfere with accurate neurochecks   Start lipitor 20mg-40mg qhs and check lipid panel, adjust as needed and follow up PCP as outpt  VTE ppx scd
61 year old female PMHx migraines was at home with spouse when she had an unwitnessed fall after drinking and having marijuana found to have ICH.    -Syncope is likely from mixing etoh and marijuana. However cannot r/o cardiac arrhythmia. So far no events on tele and never had symptoms like this before.  -Recommend checking a Utox. Possible Marijuana could have been laced with something or possibly did another drug that could have contributed.  -Plan for MRI as per neurosurgery  -TTE ordered and pending.  -She remains stable from cardiac standpoint. Recommend outpatient cardiac event monitor, which I will set up as an outpatient. If she is unable to get her TTE today and remains stable and ready for D/C, it can always be done as an outpatient, would not delay her D/C for this.

## 2021-07-12 NOTE — PROGRESS NOTE ADULT - ASSESSMENT
61 y.o. Female with PMH migraines on sumatriptan and recently started on propranolol, acoustic neuroma s/p excision (2008), remote breast reduction (1980s) presents with syncope and fall in setting of one time cannabinoid use while consuming alcohol yesterday evening.     Pt diagnosed with SAH due to fall and trauma with severe HA and new diplopia likely due to concussion  Neuro precautions with keppra/neuro checks per neurosurgery/neuro  Unlikely arrhythmogenic syncope, likely related to ETOH/THC/triptans concomitant use - avoid in the future  F/U MRI brain and TTE.  HA control as per NS
61 year old woman with h/o migraine presented s/p syncope and fall, amnesic to event, CT head shows acute subarachnoid hemorrhage.     PLAN-  CT head stable  Hospitalist consult for syncope workup  MRI +/- contrast given h/o acoustic neuroma   Advance diet and activity as tolerated  pain meds as needed   Venodynes for DVT PPX    
61 year old woman with h/o migraine presented s/p syncope and fall, amnesic to event, CT head shows acute subarachnoid hemorrhage.     PLAN-  CT head stable  Hospitalist consult appreciated, echo and cardiology consult pending  Will decrease neuro checks to Q4 hours   Will start patients home medications, Propranolol and Pristiq   MRI +/- contrast given h/o acoustic neuroma still pending   Advance diet and activity as tolerated  pain meds as needed   Venodynes for DVT PPX    will consider d/c home later today pending results of MRI and cardiology consult

## 2021-07-12 NOTE — DISCHARGE NOTE PROVIDER - CARE PROVIDERS DIRECT ADDRESSES
[email or web address]

## 2021-07-12 NOTE — DISCHARGE NOTE PROVIDER - PROVIDER TOKENS
PROVIDER:[TOKEN:[[id number]],FOLLOWUP:[2 weeks]],PROVIDER:[TOKEN:[[id number]],FOLLOWUP:[1 month]]

## 2021-07-12 NOTE — DISCHARGE NOTE PROVIDER - NSDCFUADDINST_GEN_ALL_CORE_FT
Regular Diet   Call the office with any questions or concerns  If headache worsens or you develop nausea, vomiting, worsening double vision, numbness or weakness please call the office   If you develop chest pain or shortness of breath please go to the nearest emergency room

## 2021-07-13 ENCOUNTER — TRANSCRIPTION ENCOUNTER (OUTPATIENT)
Age: 61
End: 2021-07-13

## 2021-07-13 LAB
CULTURE RESULTS: SIGNIFICANT CHANGE UP
SPECIMEN SOURCE: SIGNIFICANT CHANGE UP

## 2021-07-15 PROBLEM — Z78.9 OTHER SPECIFIED HEALTH STATUS: Chronic | Status: ACTIVE | Noted: 2021-07-12

## 2021-07-21 ENCOUNTER — NON-APPOINTMENT (OUTPATIENT)
Age: 61
End: 2021-07-21

## 2021-07-21 ENCOUNTER — TRANSCRIPTION ENCOUNTER (OUTPATIENT)
Age: 61
End: 2021-07-21

## 2021-07-23 ENCOUNTER — NON-APPOINTMENT (OUTPATIENT)
Age: 61
End: 2021-07-23

## 2021-07-23 ENCOUNTER — APPOINTMENT (OUTPATIENT)
Dept: CT IMAGING | Facility: CLINIC | Age: 61
End: 2021-07-23
Payer: COMMERCIAL

## 2021-07-23 ENCOUNTER — OUTPATIENT (OUTPATIENT)
Dept: OUTPATIENT SERVICES | Facility: HOSPITAL | Age: 61
LOS: 1 days | End: 2021-07-23
Payer: COMMERCIAL

## 2021-07-23 DIAGNOSIS — G43.709 CHRONIC MIGRAINE WITHOUT AURA, NOT INTRACTABLE, WITHOUT STATUS MIGRAINOSUS: ICD-10-CM

## 2021-07-23 DIAGNOSIS — Z98.890 OTHER SPECIFIED POSTPROCEDURAL STATES: Chronic | ICD-10-CM

## 2021-07-23 DIAGNOSIS — F10.929 ALCOHOL USE, UNSPECIFIED WITH INTOXICATION, UNSPECIFIED: ICD-10-CM
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
## 2021-07-23 DIAGNOSIS — H53.2 DIPLOPIA: ICD-10-CM

## 2021-07-23 DIAGNOSIS — Z88.2 ALLERGY STATUS TO SULFONAMIDES: ICD-10-CM
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
## 2021-07-23 DIAGNOSIS — S06.6X9A TRAUMATIC SUBARACHNOID HEMORRHAGE WITH LOSS OF CONSCIOUSNESS OF UNSPECIFIED DURATION, INITIAL ENCOUNTER: ICD-10-CM

## 2021-07-23 DIAGNOSIS — S06.6X1A TRAUMATIC SUBARACHNOID HEMORRHAGE WITH LOSS OF CONSCIOUSNESS OF 30 MINUTES OR LESS, INITIAL ENCOUNTER: ICD-10-CM

## 2021-07-23 DIAGNOSIS — F12.99 CANNABIS USE, UNSPECIFIED WITH UNSPECIFIED CANNABIS-INDUCED DISORDER: ICD-10-CM

## 2021-07-23 DIAGNOSIS — S06.5X9A TRAUMATIC SUBDURAL HEMORRHAGE WITH LOSS OF CONSCIOUSNESS OF UNSPECIFIED DURATION, INITIAL ENCOUNTER: ICD-10-CM

## 2021-07-23 DIAGNOSIS — H91.91 UNSPECIFIED HEARING LOSS, RIGHT EAR: ICD-10-CM

## 2021-07-23 PROCEDURE — 70450 CT HEAD/BRAIN W/O DYE: CPT | Mod: 26

## 2021-07-23 PROCEDURE — 70450 CT HEAD/BRAIN W/O DYE: CPT

## 2021-08-02 ENCOUNTER — NON-APPOINTMENT (OUTPATIENT)
Age: 61
End: 2021-08-02

## 2021-08-04 ENCOUNTER — APPOINTMENT (OUTPATIENT)
Dept: NEUROSURGERY | Facility: CLINIC | Age: 61
End: 2021-08-04
Payer: COMMERCIAL

## 2021-08-04 VITALS
HEART RATE: 81 BPM | BODY MASS INDEX: 26.58 KG/M2 | WEIGHT: 150 LBS | OXYGEN SATURATION: 98 % | TEMPERATURE: 97.1 F | SYSTOLIC BLOOD PRESSURE: 117 MMHG | DIASTOLIC BLOOD PRESSURE: 79 MMHG | HEIGHT: 63 IN

## 2021-08-04 DIAGNOSIS — H91.92 UNSPECIFIED HEARING LOSS, LEFT EAR: ICD-10-CM

## 2021-08-04 PROCEDURE — 99214 OFFICE O/P EST MOD 30 MIN: CPT

## 2021-08-04 NOTE — CONSULT LETTER
[Dear  ___] : Dear  [unfilled], [Courtesy Letter:] : I had the pleasure of seeing your patient, [unfilled], in my office today. [Sincerely,] : Sincerely, [FreeTextEntry2] : Blanca Lemons MD [FreeTextEntry1] : Mary Spring is a 61-year-old female who presents today for follow-up to her fall.  Patient had presented to [facility] emergency room on 7/10/2021 status post unwitnessed fall.  Patient states she was intoxicated and had smoked marijuana.  She does not recall the specific event however she states she was walking into her house and had fallen.  Positive LOC.  She was not being anticoagulated.  She denies any chest pain or dizziness prior to the event.  Patient underwent CT of the head which indicated subarachnoid hemorrhage and subdural hematoma.  No neurosurgery was performed.  Patient with history of right-sided acoustic neuroma with permanent hearing loss diagnosed in 2008. Patient also with history of migraines.  \par \par At this time patient reports some emotional changes, difficulties with focusing related to blurry vision and double vision,and  slight dizziness.  She denies any memory difficulties, sensitivity to light noise or nausea or vomiting.  She reports some fatigue.  She endorses headaches however they have not worsened.  Patient reports difficulties with balance and left eye blurry vision and double vision. Patient reports patching her left eye allows her to see better. Patient has a follow-up appointment with neuro-ophthalmology 8/19/2021.  She also endorses decreased hearing in left ear.\par \par Patient most recently underwent CT of the head on 7/23/2021 for persistent double vision and floaters. Report indicated "previously noted subarachnoid hemorrhage and right frontal subdural hematoma has demonstrated expected evolutionary changes." \par \par Patient appears in no acute distress. She is alert and oriented to time and date. When given a list of 5 words she was able to recite all 5 immediately after. After a brief pause, she was able to recite all 5 words again. After another brief pause, she was able to recite 5 out of the 5 words. She was able to recite up to 4 numbers in reverse order without difficulty. She was also able to recite the months in reverse order without difficulty. Patient displays full cervical range of motion.  No cervical tenderness noted. Motor and sensory exam intact. Deep tendon reflexes intact. Motor coordination as evidenced by finger to nose testing intact on right side. Finger to nose testing slow and hesitation noted but no past pointing noted on left side.  Cranial nerves intact. Pupils equal and reactive to light. No hearing noted on right side. Decreased hearing noted on left side. Sense of smell intact. No facial droop noted. Tongue protrudes in the midline. Facial sensation and movement symmetric and normal. Strength equal and normal to bilateral upper and lower extremities. No nystagmus noted. No saccades noted. Normal vestibular ocular reflex. Blurry vision elicited with head turns on fixed point and head turns with ambulation.  Difficulties noted with tandem stance and tandem walk. Difficulties noted with single leg stance. Negative pronator drift. Negative Romberg's. Patient was able to recall 2 out of the 5 words after a 10 minute pause.\par \par Due to patient's persistent blurry vision and double vision I have encouraged the patient to continue with her follow-up appointment with neuro-ophthalmology.  I have also provided the patient with a referral for vestibular therapy and a referral for ENT.  We will follow-up in office in approximately 6 weeks to evaluate for progression.  Patient is aware to call with any further questions or concerns or with any new or worsening symptoms.\par \par  [FreeTextEntry3] : Chasidy Jovel, MSN, FNP-BC\par Nurse Practitioner \par Neurosurgery \par [address] [place] [address], [address]nd floor \[place] [address]\par Office: [phone]\par Fax: [phone]\par

## 2021-08-04 NOTE — REVIEW OF SYSTEMS
[Migraine Headache] : migraine headaches [Eyesight Problems] : eyesight problems [Loss Of Hearing] : hearing loss [Negative] : Heme/Lymph

## 2021-09-15 ENCOUNTER — APPOINTMENT (OUTPATIENT)
Dept: NEUROSURGERY | Facility: CLINIC | Age: 61
End: 2021-09-15
Payer: COMMERCIAL

## 2021-09-15 ENCOUNTER — NON-APPOINTMENT (OUTPATIENT)
Age: 61
End: 2021-09-15

## 2021-09-15 VITALS
TEMPERATURE: 97 F | OXYGEN SATURATION: 98 % | HEIGHT: 63 IN | BODY MASS INDEX: 26.58 KG/M2 | SYSTOLIC BLOOD PRESSURE: 136 MMHG | WEIGHT: 150 LBS | DIASTOLIC BLOOD PRESSURE: 80 MMHG | HEART RATE: 62 BPM

## 2021-09-15 DIAGNOSIS — S06.5X9A TRAUMATIC SUBDURAL HEMORRHAGE WITH LOSS OF CONSCIOUSNESS OF UNSPECIFIED DURATION, INITIAL ENCOUNTER: ICD-10-CM

## 2021-09-15 DIAGNOSIS — H53.8 OTHER VISUAL DISTURBANCES: ICD-10-CM

## 2021-09-15 DIAGNOSIS — S06.6X9A TRAUMATIC SUBARACHNOID HEMORRHAGE WITH LOSS OF CONSCIOUSNESS OF UNSPECIFIED DURATION, INITIAL ENCOUNTER: ICD-10-CM

## 2021-09-15 DIAGNOSIS — R26.89 OTHER ABNORMALITIES OF GAIT AND MOBILITY: ICD-10-CM

## 2021-09-15 DIAGNOSIS — Z91.81 HISTORY OF FALLING: ICD-10-CM

## 2021-09-15 PROCEDURE — 99213 OFFICE O/P EST LOW 20 MIN: CPT

## 2021-09-17 PROBLEM — S06.6X9A SUBARACHNOID HEMATOMA: Status: ACTIVE | Noted: 2021-07-15

## 2021-09-17 PROBLEM — S06.5X9A SUBDURAL HEMATOMA: Status: ACTIVE | Noted: 2021-07-15

## 2021-09-17 PROBLEM — R26.89 UNSTABLE BALANCE: Status: ACTIVE | Noted: 2021-08-04

## 2021-09-17 PROBLEM — Z91.81 STATUS POST FALL: Status: ACTIVE | Noted: 2021-08-04

## 2021-09-17 PROBLEM — H53.8 BLURRY VISION: Status: ACTIVE | Noted: 2021-08-04

## 2021-09-17 NOTE — CONSULT LETTER
[Dear  ___] : Dear  [unfilled], [Courtesy Letter:] : I had the pleasure of seeing your patient, [unfilled], in my office today. [Sincerely,] : Sincerely, [FreeTextEntry2] : Blanca Lemons MD\par [address] [facility] [address]\[place] [address] [FreeTextEntry1] :  Mary Spring is a 61-year-old female who presents today for follow-up to her fall. Patient had presented to [facility] emergency room on 7/10/2021 status post unwitnessed fall. Patient states she was intoxicated and had smoked marijuana. She does not recall the specific event however she states she was walking into her house and had fallen. Positive LOC. She was not being anticoagulated. She denies any chest pain or dizziness prior to the event. Patient underwent CT of the head which indicated subarachnoid hemorrhage and subdural hematoma. No neurosurgery was performed. Patient with history of right-sided acoustic neuroma with permanent hearing loss diagnosed in 2008. Patient also with history of migraines.   Patient continues to experience double vision.  She has been covering the left eye as instructed by her neuro-ophthalmologist.  At this time patient does not have lenses.  She has a follow-up appointment with them next week to discuss possible lenses.  Patient continues to have decreased hearing in her left ear.  She has been evaluated by ENT who has recommended hearing aids.  Patient denies emotional changes, light or noise sensitivity, headaches, nausea or vomiting, dizziness, sleep difficulties, or difficulties with concentration or focusing.  Patient reports occasional sway with walking.  Patient appears in no acute distress. She is alert and oriented to time and date. When given a list of 5 words she was able to recite all 5 immediately after. After a brief pause, she was able to recite all 5 words again. After another brief pause, she was able to recite 5 out of the 5 words. She was able to recite up to 4 numbers in reverse order without difficulty. She was also able to recite the months in reverse order without difficulty. Patient displays full cervical range of motion. No cervical tenderness noted. Motor and sensory exam intact. Deep tendon reflexes intact. Motor coordination as evidenced by finger to nose testing intact on right side. Finger to nose testing intact. Cranial nerves intact. Pupils equal and reactive to light. No hearing noted on right side. Decreased hearing noted on left side.  No facial droop noted. Tongue protrudes in the midline. Facial sensation and movement symmetric and normal. Strength equal and normal to bilateral upper and lower extremities. No nystagmus noted. No saccades noted.  Blurry vision elicited with head turns on fixed point. No symptoms noted with head turns with ambulation. Difficulties noted with tandem stance and tandem walk. Difficulties noted with single leg stance. Negative pronator drift. Negative Romberg's. Patient was able to recall 4 out of the 5 words after a 10 minute pause.  I have encouraged the patient to continue to follow-up with ENT and neuro-ophthalmology.  I have recommended continuing with vestibular therapy as previously recommended.  Patient will call to make follow-up appointment if needed as per patient request.  Patient aware to call with any further questions or concerns or with any new or worsening symptoms.  [FreeTextEntry3] : Chasidy Jovel, MSN, FNP-BC\par Nurse Practitioner\par Neurosurgery\par [address] [place] [address], [address]nd floor \[place] [address] \par Office: [phone] \par Fax: [phone]\par \par

## 2021-12-10 ENCOUNTER — TRANSCRIPTION ENCOUNTER (OUTPATIENT)
Age: 61
End: 2021-12-10

## 2021-12-20 ENCOUNTER — TRANSCRIPTION ENCOUNTER (OUTPATIENT)
Age: 61
End: 2021-12-20